# Patient Record
Sex: MALE | Race: WHITE | Employment: FULL TIME | URBAN - METROPOLITAN AREA
[De-identification: names, ages, dates, MRNs, and addresses within clinical notes are randomized per-mention and may not be internally consistent; named-entity substitution may affect disease eponyms.]

---

## 2017-10-05 ENCOUNTER — HOSPITAL ENCOUNTER (EMERGENCY)
Age: 52
Discharge: HOME | End: 2017-10-05
Attending: EMERGENCY MEDICINE
Payer: SELF-PAY

## 2017-10-05 ENCOUNTER — APPOINTMENT (OUTPATIENT)
Dept: CT IMAGING | Age: 52
End: 2017-10-05
Attending: EMERGENCY MEDICINE
Payer: SELF-PAY

## 2017-10-05 VITALS
RESPIRATION RATE: 18 BRPM | WEIGHT: 310.19 LBS | OXYGEN SATURATION: 95 % | HEART RATE: 104 BPM | HEIGHT: 74 IN | SYSTOLIC BLOOD PRESSURE: 169 MMHG | BODY MASS INDEX: 39.81 KG/M2 | DIASTOLIC BLOOD PRESSURE: 121 MMHG | TEMPERATURE: 99.3 F

## 2017-10-05 DIAGNOSIS — I10 ACCELERATED HYPERTENSION: Primary | ICD-10-CM

## 2017-10-05 DIAGNOSIS — I42.9 CARDIOMYOPATHY, UNSPECIFIED TYPE (HCC): ICD-10-CM

## 2017-10-05 PROCEDURE — 71275 CT ANGIOGRAPHY CHEST: CPT

## 2017-10-05 PROCEDURE — 74011250637 HC RX REV CODE- 250/637: Performed by: EMERGENCY MEDICINE

## 2017-10-05 PROCEDURE — 74011250636 HC RX REV CODE- 250/636: Performed by: EMERGENCY MEDICINE

## 2017-10-05 PROCEDURE — 74011636320 HC RX REV CODE- 636/320: Performed by: EMERGENCY MEDICINE

## 2017-10-05 PROCEDURE — 96360 HYDRATION IV INFUSION INIT: CPT

## 2017-10-05 PROCEDURE — 99284 EMERGENCY DEPT VISIT MOD MDM: CPT

## 2017-10-05 RX ORDER — SODIUM CHLORIDE 9 MG/ML
50 INJECTION, SOLUTION INTRAVENOUS
Status: COMPLETED | OUTPATIENT
Start: 2017-10-05 | End: 2017-10-05

## 2017-10-05 RX ORDER — AMLODIPINE BESYLATE 5 MG/1
5 TABLET ORAL DAILY
Status: DISCONTINUED | OUTPATIENT
Start: 2017-10-06 | End: 2017-10-05

## 2017-10-05 RX ORDER — SODIUM CHLORIDE 0.9 % (FLUSH) 0.9 %
10 SYRINGE (ML) INJECTION
Status: COMPLETED | OUTPATIENT
Start: 2017-10-05 | End: 2017-10-05

## 2017-10-05 RX ORDER — AMLODIPINE BESYLATE 5 MG/1
5 TABLET ORAL DAILY
Qty: 30 TAB | Refills: 3 | Status: SHIPPED | OUTPATIENT
Start: 2017-10-05

## 2017-10-05 RX ORDER — AMLODIPINE BESYLATE 5 MG/1
5 TABLET ORAL
Status: COMPLETED | OUTPATIENT
Start: 2017-10-05 | End: 2017-10-05

## 2017-10-05 RX ADMIN — AMLODIPINE BESYLATE 5 MG: 5 TABLET ORAL at 17:45

## 2017-10-05 RX ADMIN — SODIUM CHLORIDE 1000 ML: 900 INJECTION, SOLUTION INTRAVENOUS at 16:10

## 2017-10-05 RX ADMIN — Medication 10 ML: at 16:00

## 2017-10-05 RX ADMIN — SODIUM CHLORIDE 50 ML/HR: 900 INJECTION, SOLUTION INTRAVENOUS at 16:00

## 2017-10-05 RX ADMIN — IOPAMIDOL 85 ML: 755 INJECTION, SOLUTION INTRAVENOUS at 16:00

## 2017-10-05 NOTE — ED NOTES
Bedside shift change report given to Thania Stack (oncoming nurse) by me (offgoing nurse). Report included the following information SBAR, ED Summary, Procedure Summary and MAR.

## 2017-10-05 NOTE — ED NOTES
Discharge instructions given to patient by Daniel Dee DO . Pt verbalized understanding of discharge instructions. Pt discharged without difficulty. Pt discharged in stable condition via ambulation, accompanied by self.

## 2017-10-05 NOTE — DISCHARGE INSTRUCTIONS
Learning About High Blood Pressure  What is high blood pressure? Blood pressure is a measure of how hard the blood pushes against the walls of your arteries. It's normal for blood pressure to go up and down throughout the day, but if it stays up, you have high blood pressure. Another name for high blood pressure is hypertension. Two numbers tell you your blood pressure. The first number is the systolic pressure. It shows how hard the blood pushes when your heart is pumping. The second number is the diastolic pressure. It shows how hard the blood pushes between heartbeats, when your heart is relaxed and filling with blood. A blood pressure of less than 120/80 (say \"120 over 80\") is ideal for an adult. High blood pressure is 140/90 or higher. You have high blood pressure if your top number is 140 or higher or your bottom number is 90 or higher, or both. Many people fall into the category in between, called prehypertension. People with prehypertension need to make lifestyle changes to bring their blood pressure down and help prevent or delay high blood pressure. What happens when you have high blood pressure? · Blood flows through your arteries with too much force. Over time, this damages the walls of your arteries. But you can't feel it. High blood pressure usually doesn't cause symptoms. · Fat and calcium start to build up in your arteries. This buildup is called plaque. Plaque makes your arteries narrower and stiffer. Blood can't flow through them as easily. · This lack of good blood flow starts to damage some of the organs in your body. This can lead to problems such as coronary artery disease and heart attack, heart failure, stroke, kidney failure, and eye damage. How can you prevent high blood pressure? · Stay at a healthy weight. · Try to limit how much sodium you eat to less than 2,300 milligrams (mg) a day.  If you limit your sodium to 1,500 mg a day, you can lower your blood pressure even more.  ¨ Buy foods that are labeled \"unsalted,\" \"sodium-free,\" or \"low-sodium. \" Foods labeled \"reduced-sodium\" and \"light sodium\" may still have too much sodium. ¨ Flavor your food with garlic, lemon juice, onion, vinegar, herbs, and spices instead of salt. Do not use soy sauce, steak sauce, onion salt, garlic salt, mustard, or ketchup on your food. ¨ Use less salt (or none) when recipes call for it. You can often use half the salt a recipe calls for without losing flavor. · Be physically active. Get at least 30 minutes of exercise on most days of the week. Walking is a good choice. You also may want to do other activities, such as running, swimming, cycling, or playing tennis or team sports. · Limit alcohol to 2 drinks a day for men and 1 drink a day for women. · Eat plenty of fruits, vegetables, and low-fat dairy products. Eat less saturated and total fats. How is high blood pressure treated? · Your doctor will suggest making lifestyle changes. For example, your doctor may ask you to eat healthy foods, quit smoking, lose extra weight, and be more active. · If lifestyle changes don't help enough or your blood pressure is very high, you will have to take medicine every day. Follow-up care is a key part of your treatment and safety. Be sure to make and go to all appointments, and call your doctor if you are having problems. It's also a good idea to know your test results and keep a list of the medicines you take. Where can you learn more? Go to http://ana lilia-miguel.info/. Enter P501 in the search box to learn more about \"Learning About High Blood Pressure. \"  Current as of: March 23, 2016  Content Version: 11.3  © 6505-4429 [employer]. Care instructions adapted under license by [employer] (which disclaims liability or warranty for this information).  If you have questions about a medical condition or this instruction, always ask your healthcare professional. [employer] disclaims any warranty or liability for your use of this information. [facility] Departments     For adult and child immunizations, family planning, TB screening, STD testing and women's health services. [facility]: [place] [phone]      [address]   [address]   [address]   [address] [facility] Street: Urbano Enrique [address] [phone]      [address]          [address] [place] [address]     For primary care services, woman and child wellness, and some clinics providing specialty care. [facility] -- [address] [facility] 2525 [facility] [phone]   [address] [facility] 200 [facility] 3614 [facility] [phone]   339 [facility] [address] [phone]   [address]  [employer] [address] [place] [address] 5850  Community  [phone]   [address] [place] [address] [address] [phone]   [facility] 81 [facility] [phone]   Venancio Francois [facility] 1051 [facility] [phone]   [facility]: [facility] [address], [address] [facility], [address] [phone]     [address] [phone]   [facility] [address]50  Community  [phone]   [employer]  [address], [address] (www.[email or web address]/about/mission. asp) [phone]         Sexual Health/Woman Wellness Clinics    For STD/HIV testing and treatment, pregnancy testing and services, men's health, birth control services, LGBT services, and hepatitis/HPV vaccine services. [employer] for [place] [employer] [address]. [facility] [address] [facility] [address] [facility] 1579 [address] [phone]   [facility] [address], 5th floor [phone]   Pregnancy \[facility] [place] [facility]\"" of [address] Yovanny Chacko [address] for Women 5130 [facility].  [place] [phone]         [address]67 [facility] 94 [facility]   [phone]   [address]   [phone]   Women, Infant and Children's Services: [address] [phone]       600 [facility]   [phone]   [address]   [facility]     [phone]   [address] Crisis   [address] [facility] [address] [phone]     Local Primary Care Physicians  [facility] [phone]  MD Kevin Britton MD Cranford Lauber, MD [facility] Doctors [phone]  Elizabeth Umana, MD Olivia Davidson MD Sallee [place], MD [address] [address] [phone]  [place] [address] [facility] [phone]  MD Jodi Rose MD Scarlett Haring, MD Jaymes Collar, MD   [facility] [phone]  JYFE XXBRKN GA, MD Stephanie Agosto,  [facility] [address] [phone]  MD Ramandeep Page MD Donavon Grove, MD Erroll Pang, MD Frederico Hines, MD Wendi Daisy, MD Dona Frizzle, MD   17 57 [facility]  Kim Oropeza MD [address] [facility] [phone]  MD Rajwinder Cabrera, NP  MD Cameron Escalante MD Alean Gondola, MD Carlus Asa, MD Teddi Blowers, MD   3802 [facility] [phone]  MD Lizette Pickard, FNP  Haily Caballero, NP  Tabatha Landon, MD Estrellita Schofield MD Ponce Formica, MD [facility] [phone]  AshleyMD Nancy Garcia MD Charleen Clement, MD Evalyn Andrea, MD Charlena Madison, MD   [address] [phone]  MD Chad Rangel Hilaria Gaucher, 611 [facility] [phone]  MD Nel Menendez, MD Va Rowe MD   [facility] [phone]  MD Rocky Sexton, MD Delgado Brooks MD Lehman Circle, MD Gogo Stevens, JOAN Correia MD 1619 [facility]   [phone]  MD Sigifredo Jarrett MD Valeri New, MD   4494 [facility] [phone]  [address] [place] [address] EarleMD Phuong rahman, JEANNINE Mcadams, AMAYA Mcadams, GREYSONP  AMAYA Amezquita MD Rolley Less, JOAN Delatorre, DO Miscellaneous:  Haven Collazo -[phone]

## 2017-10-05 NOTE — ED TRIAGE NOTES
Ran out of his BP medications 9 months ago and has not established a PCP yet. Came in because his family told him he needed to go get it checked. Went to [facility] and it was high and his d dimer was elevated.

## 2017-10-05 NOTE — ED PROVIDER NOTES
[facility] 76.  EMERGENCY DEPARTMENT HISTORY AND PHYSICAL EXAM       Date of Service: 10/5/2017   Patient Name: Aminata Braxton   YOB: 1965  Medical Record Number: [id number]    History of Presenting Illness     Chief Complaint   Patient presents with    Referral / Consult     pt went to [facility] for hypertension follow up as he has been out of his med for 9 months. He had EKG and labs done which showed elevated d-dimer. Referred to ED for CT scan. History Provided By:  patient    Additional History:   Amintaa Braxton is a 46 y.o. male with PMhx significant for HTN who presents ambulatory to the ED with cc of fatigue, SOB and leg swelling, all sx present x 1.5 mo. Pt states that he moved to [place] 11 months ago and has not established primary care. Because of this, he states that he has not been taking medications for his HTN. He cannot recall the names of his medications. He states that his fatigue/SOB/leg swelling have been on going and that his family urged him to be evaluated today at [facility]. [facility] noted an elevated D dimer and referred him to the ED. Pt also reports diarrhea x 5 days. He denies CP, nausea, vomiting, hematuria. He denies alcohol/drugs/smoking. 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 He denies drinking coffee/energy drinks. Family hx significant for MI. Social Hx: - Tobacco, - EtOH, - Illicit Drugs    There are no other complaints, changes or physical findings at this time. Primary Care Provider: None       Past History     Past Medical History:   Past Medical History:   Diagnosis Date    Hypertension     Ill-defined condition     broken clavicle, lt elbow - not surgically repaired        Past Surgical History:   History reviewed. No pertinent surgical history. Family History:   History reviewed. No pertinent family history.      Social History:   Social History   Substance Use Topics    Smoking status: Never Smoker    Smokeless tobacco: Never Used    Alcohol use No        Allergies:   No Known Allergies      Review of Systems   Review of Systems   Constitutional: Positive for fatigue. Negative for appetite change, chills and fever. HENT: Negative. Negative for congestion, rhinorrhea, sinus pressure and sore throat. Eyes: Negative. Respiratory: Positive for shortness of breath. Negative for cough, choking, chest tightness and wheezing. Cardiovascular: Positive for leg swelling. Negative for chest pain and palpitations. Gastrointestinal: Positive for diarrhea. Negative for abdominal pain, constipation, nausea and vomiting. Endocrine: Negative. Genitourinary: Negative. Negative for difficulty urinating, dysuria, flank pain, hematuria and urgency. Musculoskeletal: Negative. Skin: Negative. Neurological: Negative. Negative for dizziness, speech difficulty, weakness, light-headedness, numbness and headaches. Psychiatric/Behavioral: Negative. All other systems reviewed and are negative. Physical Exam  Physical Exam   Constitutional: He is oriented to person, place, and time. He appears well-developed and well-nourished. No distress. HENT:   Head: Normocephalic and atraumatic. Mouth/Throat: Oropharynx is clear and moist. No oropharyngeal exudate. Eyes: Conjunctivae and EOM are normal. Pupils are equal, round, and reactive to light. Neck: Normal range of motion. Neck supple. No JVD present. No tracheal deviation present. Cardiovascular: Normal rate, regular rhythm, normal heart sounds and intact distal pulses. No murmur heard. Pulmonary/Chest: Effort normal and breath sounds normal. No stridor. No respiratory distress. He has no wheezes. He has no rales. He exhibits no tenderness. Abdominal: Soft. He exhibits no distension. There is no tenderness. There is no rebound and no guarding. Obese     Musculoskeletal: Normal range of motion.  He exhibits no edema or tenderness. Neurological: He is alert and oriented to person, place, and time. No cranial nerve deficit. No focal motor or sensory deficits    Skin: Skin is warm and dry. He is not diaphoretic. Psychiatric: He has a normal mood and affect. His behavior is normal.   Nursing note and vitals reviewed. Medical Decision Making   I am the first provider for this patient. I reviewed the vital signs, available nursing notes, past medical history, past surgical history, family history and social history. Provider Notes:   DDx: PE, accelerated HTN, ACS      ED Course:  4:35 PM   Initial assessment performed. The patients presenting problems have been discussed, and they are in agreement with the care plan formulated and outlined with them. I have encouraged them to ask questions as they arise throughout their visit. Nitroglycerin paste removed from left upper chest that had been placed by Anuj Sutherland DO        Diagnostic Study Results     Labs -   Results and EKG reviewed from Anuj Sutherland DO    Radiologic Studies -  The following have been ordered and reviewed:  CTA CHEST W OR W WO CONT   Final Result   EXAM:  CTA CHEST W OR W WO CONT     INDICATION:   Chest pain, SOA, elevated D Dimer     COMPARISON: None.     CONTRAST:  85 mL of Isovue-370.     TECHNIQUE:   Precontrast  images were obtained to localize the volume for acquisition. Multislice helical CT arteriography was performed from the diaphragm to the  thoracic inlet during uneventful rapid bolus intravenous contrast  administration. Lung and soft tissue windows were generated. Coronal and  sagittal images were generated and 3D post processing consisting of coronal  maximum intensity images was performed.   CT dose reduction was achieved through  use of a standardized protocol tailored for this examination and automatic  exposure control for dose modulation.     FINDINGS:     THYROID: No nodule. MEDIASTINUM: No mass or lymphadenopathy. JAZMIN: No mass or lymphadenopathy. THORACIC AORTA: No dissection or aneurysm. There is an incidental aberrant right  subclavian artery passing posterior to the trachea and esophagus. PULMONARY ARTERIES: Normal in caliber. No evidence of pulmonary embolus. TRACHEA/BRONCHI: Patent. ESOPHAGUS: No wall thickening or dilatation. HEART: The heart is significantly enlarged. PLEURA: No effusion or pneumothorax. LUNGS: No nodule, mass, or airspace disease. INCIDENTALLY IMAGED UPPER ABDOMEN: No focal abnormality. BONES: No destructive bone lesion.     IMPRESSION  IMPRESSION:     1. Negative for pulmonary embolus. 2. Significant cardiomegaly. 3. Aberrant right subclavian artery     CT Results  (Last 48 hours)               10/05/17 1600  CTA CHEST W OR W WO CONT Final result    Impression:  IMPRESSION:       1. Negative for pulmonary embolus. 2. Significant cardiomegaly. 3. Aberrant right subclavian artery                           Narrative:  EXAM:  CTA CHEST W OR W WO CONT       INDICATION:   Chest pain, SOA, elevated D Dimer       COMPARISON: None. CONTRAST:  85 mL of Isovue-370. TECHNIQUE:    Precontrast  images were obtained to localize the volume for acquisition. Multislice helical CT arteriography was performed from the diaphragm to the   thoracic inlet during uneventful rapid bolus intravenous contrast   administration. Lung and soft tissue windows were generated. Coronal and   sagittal images were generated and 3D post processing consisting of coronal   maximum intensity images was performed. CT dose reduction was achieved through   use of a standardized protocol tailored for this examination and automatic   exposure control for dose modulation. FINDINGS:       THYROID: No nodule. MEDIASTINUM: No mass or lymphadenopathy. JAZMIN: No mass or lymphadenopathy. THORACIC AORTA: No dissection or aneurysm.  There is an incidental aberrant right   subclavian artery passing posterior to the trachea and esophagus. PULMONARY ARTERIES: Normal in caliber. No evidence of pulmonary embolus. TRACHEA/BRONCHI: Patent. ESOPHAGUS: No wall thickening or dilatation. HEART: The heart is significantly enlarged. PLEURA: No effusion or pneumothorax. LUNGS: No nodule, mass, or airspace disease. INCIDENTALLY IMAGED UPPER ABDOMEN: No focal abnormality. BONES: No destructive bone lesion. CXR Results  (Last 48 hours)    None          Vital Signs-Reviewed the patient's vital signs. Patient Vitals for the past 12 hrs:   Temp Pulse Resp BP SpO2   10/05/17 1630 - - - (!) 157/102 95 %   10/05/17 1615 - - - (!) 175/111 96 %   10/05/17 1601 - - - (!) 162/112 -   10/05/17 1500 - - - (!) 156/114 95 %   10/05/17 1433 - - - (!) 163/113 95 %   10/05/17 1422 99.3 °F (37.4 °C) (!) 104 18 (!) 176/124 97 %       Medications Given in the ED:  Medications   amLODIPine (NORVASC) tablet 5 mg (not administered)   sodium chloride 0.9 % bolus infusion 1,000 mL (1,000 mL IntraVENous New Bag 10/5/17 1610)   0.9% sodium chloride infusion (50 mL/hr IntraVENous New Bag 10/5/17 1600)   sodium chloride (NS) flush 10 mL (10 mL IntraVENous Given 10/5/17 1600)   iopamidol (ISOVUE-370) 76 % injection 100 mL (85 mL IntraVENous Given 10/5/17 1600)       Pulse Oximetry Analysis - Normal 95% on room air     Cardiac Monitor:   Rate: 118    Discussed with pt importance of taking medication for BP, renal function up, unknown baseline, no neurologic deficits, cardiomyopathy on CT, will start Amlodipine, discussed with pt follow-up either a free clinic or obtain PCP, Marbin Santos DO    Diagnosis   Clinical Impression:   1. Accelerated hypertension    2.  Cardiomyopathy, unspecified type ([facility] [address]         Plan:  1:   Follow-up Information     Follow up With Details Comments Contact Info    None   None (395) Patient stated that they have no PCP      Roe Henriquez MD   [address] [address]  [phone]          2:   Current Discharge Medication List      START taking these medications    Details   amLODIPine (NORVASC) 5 mg tablet Take 1 Tab by mouth daily. Qty: 30 Tab, Refills: 3           Return to ED if Worse    Disposition Note:  Discharge Note:  5:33 PM  The pt is ready for discharge. The pt's signs, symptoms, diagnosis, and discharge instructions have been discussed and pt has conveyed their understanding. The pt is to follow up as recommended or return to ER should their symptoms worsen. Plan has been discussed and pt is in agreement. This note is prepared by Reji Mcgovern, acting as a Scribe for [address] DO: The scribe's documentation has been prepared under my direction and personally reviewed by me in its entirety. I confirm that the notes above accurately reflects all work, treatment, procedures, and medical decision making performed by me.    _______________________________   Attestations: This note is prepared by Reji Mcgovern, acting as a Scribe for [address] DO: The scribe's documentation has been prepared under my direction and personally reviewed by me in its entirety.  I confirm that the notes above accurately reflects all work, treatment, procedures, and medical decision making performed by me.  _______________________________

## 2017-10-05 NOTE — ED NOTES
Patient resting comfortably in bed and offered bathroom access. Patient rated pain at 0. Call bell and possessions within reach.

## 2018-07-30 PROBLEM — Z00.00 ENCOUNTER FOR PREVENTIVE HEALTH EXAMINATION: Status: ACTIVE | Noted: 2018-07-30

## 2018-08-08 ENCOUNTER — OUTPATIENT (OUTPATIENT)
Dept: OUTPATIENT SERVICES | Facility: HOSPITAL | Age: 53
LOS: 1 days | End: 2018-08-08
Payer: SELF-PAY

## 2018-08-08 ENCOUNTER — APPOINTMENT (OUTPATIENT)
Dept: CARDIOLOGY | Facility: CLINIC | Age: 53
End: 2018-08-08

## 2018-08-08 DIAGNOSIS — Z82.49 FAMILY HISTORY OF ISCHEMIC HEART DISEASE AND OTHER DISEASES OF THE CIRCULATORY SYSTEM: ICD-10-CM

## 2018-08-08 DIAGNOSIS — R00.2 PALPITATIONS: ICD-10-CM

## 2018-08-08 DIAGNOSIS — I10 ESSENTIAL (PRIMARY) HYPERTENSION: ICD-10-CM

## 2018-08-08 DIAGNOSIS — I25.10 ATHEROSCLEROTIC HEART DISEASE OF NATIVE CORONARY ARTERY WITHOUT ANGINA PECTORIS: ICD-10-CM

## 2018-08-08 PROCEDURE — G0463: CPT

## 2018-08-08 RX ORDER — METOPROLOL SUCCINATE 50 MG/1
50 TABLET, EXTENDED RELEASE ORAL DAILY
Qty: 90 | Refills: 3 | Status: ACTIVE | COMMUNITY
Start: 2018-08-08 | End: 1900-01-01

## 2018-10-10 ENCOUNTER — APPOINTMENT (OUTPATIENT)
Dept: CARDIOLOGY | Facility: CLINIC | Age: 53
End: 2018-10-10

## 2018-10-10 PROBLEM — Z82.49 FAMILY HISTORY OF CORONARY ARTERY DISEASE: Status: ACTIVE | Noted: 2018-10-10

## 2018-10-10 PROBLEM — Z82.49 FAMILY HISTORY OF VALVULAR HEART DISEASE: Status: ACTIVE | Noted: 2018-10-10

## 2018-10-10 PROBLEM — Z82.49 FAMILY HISTORY OF HYPERTENSION: Status: ACTIVE | Noted: 2018-10-10

## 2018-10-10 RX ORDER — HYDROCHLOROTHIAZIDE 25 MG/1
25 TABLET ORAL DAILY
Qty: 30 | Refills: 3 | Status: ACTIVE | COMMUNITY

## 2018-10-10 RX ORDER — NIFEDIPINE 90 MG/1
90 TABLET, EXTENDED RELEASE ORAL DAILY
Refills: 1 | Status: ACTIVE | COMMUNITY

## 2019-04-03 ENCOUNTER — APPOINTMENT (OUTPATIENT)
Dept: CARDIOLOGY | Facility: CLINIC | Age: 54
End: 2019-04-03

## 2022-10-18 ENCOUNTER — NON-APPOINTMENT (OUTPATIENT)
Age: 57
End: 2022-10-18

## 2024-12-25 PROBLEM — F10.90 ALCOHOL USE: Status: INACTIVE | Noted: 2018-10-10

## 2025-01-25 ENCOUNTER — EMERGENCY (EMERGENCY)
Facility: HOSPITAL | Age: 60
LOS: 1 days | Discharge: HOME | End: 2025-01-25
Attending: EMERGENCY MEDICINE
Payer: MEDICAID

## 2025-01-25 VITALS
RESPIRATION RATE: 18 BRPM | TEMPERATURE: 98 F | SYSTOLIC BLOOD PRESSURE: 190 MMHG | OXYGEN SATURATION: 98 % | DIASTOLIC BLOOD PRESSURE: 133 MMHG | HEART RATE: 89 BPM

## 2025-01-25 LAB
ALBUMIN SERPL ELPH-MCNC: 4.9 G/DL — SIGNIFICANT CHANGE UP (ref 3.3–5.2)
ALP SERPL-CCNC: 73 U/L — SIGNIFICANT CHANGE UP (ref 40–120)
ALT FLD-CCNC: 28 U/L — SIGNIFICANT CHANGE UP
ANION GAP SERPL CALC-SCNC: 15 MMOL/L — SIGNIFICANT CHANGE UP (ref 5–17)
APPEARANCE UR: CLEAR — SIGNIFICANT CHANGE UP
AST SERPL-CCNC: 20 U/L — SIGNIFICANT CHANGE UP
BACTERIA # UR AUTO: NEGATIVE /HPF — SIGNIFICANT CHANGE UP
BASOPHILS # BLD AUTO: 0.06 K/UL — SIGNIFICANT CHANGE UP (ref 0–0.2)
BASOPHILS NFR BLD AUTO: 0.5 % — SIGNIFICANT CHANGE UP (ref 0–2)
BILIRUB SERPL-MCNC: 0.5 MG/DL — SIGNIFICANT CHANGE UP (ref 0.4–2)
BILIRUB UR-MCNC: NEGATIVE — SIGNIFICANT CHANGE UP
BUN SERPL-MCNC: 20.8 MG/DL — HIGH (ref 8–20)
CALCIUM SERPL-MCNC: 9.4 MG/DL — SIGNIFICANT CHANGE UP (ref 8.4–10.5)
CAST: 1 /LPF — SIGNIFICANT CHANGE UP (ref 0–4)
CHLORIDE SERPL-SCNC: 101 MMOL/L — SIGNIFICANT CHANGE UP (ref 96–108)
CO2 SERPL-SCNC: 26 MMOL/L — SIGNIFICANT CHANGE UP (ref 22–29)
COLOR SPEC: YELLOW — SIGNIFICANT CHANGE UP
CREAT SERPL-MCNC: 1.3 MG/DL — SIGNIFICANT CHANGE UP (ref 0.5–1.3)
DIFF PNL FLD: ABNORMAL
EGFR: 63 ML/MIN/1.73M2 — SIGNIFICANT CHANGE UP
EOSINOPHIL # BLD AUTO: 0.17 K/UL — SIGNIFICANT CHANGE UP (ref 0–0.5)
EOSINOPHIL NFR BLD AUTO: 1.3 % — SIGNIFICANT CHANGE UP (ref 0–6)
GLUCOSE SERPL-MCNC: 173 MG/DL — HIGH (ref 70–99)
GLUCOSE UR QL: NEGATIVE MG/DL — SIGNIFICANT CHANGE UP
HCT VFR BLD CALC: 47.6 % — SIGNIFICANT CHANGE UP (ref 39–50)
HGB BLD-MCNC: 15.9 G/DL — SIGNIFICANT CHANGE UP (ref 13–17)
IMM GRANULOCYTES NFR BLD AUTO: 0.5 % — SIGNIFICANT CHANGE UP (ref 0–0.9)
KETONES UR-MCNC: NEGATIVE MG/DL — SIGNIFICANT CHANGE UP
LEUKOCYTE ESTERASE UR-ACNC: NEGATIVE — SIGNIFICANT CHANGE UP
LIDOCAIN IGE QN: 44 U/L — SIGNIFICANT CHANGE UP (ref 22–51)
LYMPHOCYTES # BLD AUTO: 1.28 K/UL — SIGNIFICANT CHANGE UP (ref 1–3.3)
LYMPHOCYTES # BLD AUTO: 9.8 % — LOW (ref 13–44)
MCHC RBC-ENTMCNC: 29.1 PG — SIGNIFICANT CHANGE UP (ref 27–34)
MCHC RBC-ENTMCNC: 33.4 G/DL — SIGNIFICANT CHANGE UP (ref 32–36)
MCV RBC AUTO: 87.2 FL — SIGNIFICANT CHANGE UP (ref 80–100)
MONOCYTES # BLD AUTO: 0.59 K/UL — SIGNIFICANT CHANGE UP (ref 0–0.9)
MONOCYTES NFR BLD AUTO: 4.5 % — SIGNIFICANT CHANGE UP (ref 2–14)
NEUTROPHILS # BLD AUTO: 10.92 K/UL — HIGH (ref 1.8–7.4)
NEUTROPHILS NFR BLD AUTO: 83.4 % — HIGH (ref 43–77)
NITRITE UR-MCNC: NEGATIVE — SIGNIFICANT CHANGE UP
PH UR: 6 — SIGNIFICANT CHANGE UP (ref 5–8)
PLATELET # BLD AUTO: 187 K/UL — SIGNIFICANT CHANGE UP (ref 150–400)
POTASSIUM SERPL-MCNC: 3.9 MMOL/L — SIGNIFICANT CHANGE UP (ref 3.5–5.3)
POTASSIUM SERPL-SCNC: 3.9 MMOL/L — SIGNIFICANT CHANGE UP (ref 3.5–5.3)
PROT SERPL-MCNC: 8.2 G/DL — SIGNIFICANT CHANGE UP (ref 6.6–8.7)
PROT UR-MCNC: 30 MG/DL
RBC # BLD: 5.46 M/UL — SIGNIFICANT CHANGE UP (ref 4.2–5.8)
RBC # FLD: 13.3 % — SIGNIFICANT CHANGE UP (ref 10.3–14.5)
RBC CASTS # UR COMP ASSIST: 140 /HPF — HIGH (ref 0–4)
SODIUM SERPL-SCNC: 142 MMOL/L — SIGNIFICANT CHANGE UP (ref 135–145)
SP GR SPEC: 1.02 — SIGNIFICANT CHANGE UP (ref 1–1.03)
SQUAMOUS # UR AUTO: 1 /HPF — SIGNIFICANT CHANGE UP (ref 0–5)
UROBILINOGEN FLD QL: 0.2 MG/DL — SIGNIFICANT CHANGE UP (ref 0.2–1)
WBC # BLD: 13.08 K/UL — HIGH (ref 3.8–10.5)
WBC # FLD AUTO: 13.08 K/UL — HIGH (ref 3.8–10.5)
WBC UR QL: 1 /HPF — SIGNIFICANT CHANGE UP (ref 0–5)

## 2025-01-25 PROCEDURE — 74176 CT ABD & PELVIS W/O CONTRAST: CPT | Mod: 26

## 2025-01-25 PROCEDURE — 83690 ASSAY OF LIPASE: CPT

## 2025-01-25 PROCEDURE — 96375 TX/PRO/DX INJ NEW DRUG ADDON: CPT

## 2025-01-25 PROCEDURE — 99284 EMERGENCY DEPT VISIT MOD MDM: CPT | Mod: 25

## 2025-01-25 PROCEDURE — 74176 CT ABD & PELVIS W/O CONTRAST: CPT | Mod: MC

## 2025-01-25 PROCEDURE — 96361 HYDRATE IV INFUSION ADD-ON: CPT

## 2025-01-25 PROCEDURE — 36415 COLL VENOUS BLD VENIPUNCTURE: CPT

## 2025-01-25 PROCEDURE — 87086 URINE CULTURE/COLONY COUNT: CPT

## 2025-01-25 PROCEDURE — 85025 COMPLETE CBC W/AUTO DIFF WBC: CPT

## 2025-01-25 PROCEDURE — 99284 EMERGENCY DEPT VISIT MOD MDM: CPT

## 2025-01-25 PROCEDURE — 81001 URINALYSIS AUTO W/SCOPE: CPT

## 2025-01-25 PROCEDURE — 80053 COMPREHEN METABOLIC PANEL: CPT

## 2025-01-25 PROCEDURE — 96374 THER/PROPH/DIAG INJ IV PUSH: CPT

## 2025-01-25 RX ORDER — KETOROLAC TROMETHAMINE 30 MG/ML
30 INJECTION INTRAMUSCULAR; INTRAVENOUS ONCE
Refills: 0 | Status: DISCONTINUED | OUTPATIENT
Start: 2025-01-25 | End: 2025-01-25

## 2025-01-25 RX ORDER — OXYCODONE AND ACETAMINOPHEN 5; 325 MG/1; MG/1
1 TABLET ORAL
Qty: 9 | Refills: 0
Start: 2025-01-25 | End: 2025-01-27

## 2025-01-25 RX ORDER — SODIUM CHLORIDE 9 MG/ML
1000 INJECTION, SOLUTION INTRAMUSCULAR; INTRAVENOUS; SUBCUTANEOUS ONCE
Refills: 0 | Status: COMPLETED | OUTPATIENT
Start: 2025-01-25 | End: 2025-01-25

## 2025-01-25 RX ORDER — ONDANSETRON 4 MG/1
4 TABLET ORAL ONCE
Refills: 0 | Status: COMPLETED | OUTPATIENT
Start: 2025-01-25 | End: 2025-01-25

## 2025-01-25 RX ORDER — TAMSULOSIN HYDROCHLORIDE 0.4 MG/1
1 CAPSULE ORAL
Qty: 3 | Refills: 0
Start: 2025-01-25 | End: 2025-01-27

## 2025-01-25 RX ORDER — IBUPROFEN 200 MG
1 TABLET ORAL
Qty: 15 | Refills: 0
Start: 2025-01-25 | End: 2025-01-29

## 2025-01-25 RX ORDER — ONDANSETRON 4 MG/1
1 TABLET ORAL
Qty: 1 | Refills: 0
Start: 2025-01-25 | End: 2025-01-27

## 2025-01-25 RX ADMIN — ONDANSETRON 4 MILLIGRAM(S): 4 TABLET ORAL at 05:32

## 2025-01-25 RX ADMIN — KETOROLAC TROMETHAMINE 30 MILLIGRAM(S): 30 INJECTION INTRAMUSCULAR; INTRAVENOUS at 06:42

## 2025-01-25 RX ADMIN — SODIUM CHLORIDE 1000 MILLILITER(S): 9 INJECTION, SOLUTION INTRAMUSCULAR; INTRAVENOUS; SUBCUTANEOUS at 05:32

## 2025-01-25 RX ADMIN — KETOROLAC TROMETHAMINE 30 MILLIGRAM(S): 30 INJECTION INTRAMUSCULAR; INTRAVENOUS at 05:34

## 2025-01-25 RX ADMIN — SODIUM CHLORIDE 1000 MILLILITER(S): 9 INJECTION, SOLUTION INTRAMUSCULAR; INTRAVENOUS; SUBCUTANEOUS at 06:42

## 2025-01-25 NOTE — ED PROVIDER NOTE - OBJECTIVE STATEMENT
59 year old male PMHx kidney stones present to ED for left sided flank pain. Pt reports onset 4 hours prior to arrival. +nausea, +vomiting. NBNB emesis. Denies fever, chills, HA, lightheadedness, dizziness, SOB, CP, diarrhea, abd pain.

## 2025-01-25 NOTE — ED PROVIDER NOTE - PATIENT PORTAL LINK FT
You can access the FollowMyHealth Patient Portal offered by [facility] by registering at the following website: [email or web address]. By joining [employer]’s FollowMyHealth portal, you will also be able to view your health information using other applications (apps) compatible with our system.

## 2025-01-25 NOTE — ED PROVIDER NOTE - CLINICAL SUMMARY MEDICAL DECISION MAKING FREE TEXT BOX
59 year old male PMHx kidney stones present to ED for left sided flank pain. Pt reports onset 4 hours prior to arrival. +nausea, +vomiting. NBNB emesis. Denies fever, chills, HA, lightheadedness, dizziness, SOB, CP, diarrhea, abd pain.   CT, labs, meds, re-assess 59 year old male PMHx kidney stones present to ED for left sided flank pain. Pt reports onset 4 hours prior to arrival. +nausea, +vomiting. NBNB emesis. Denies fever, chills, HA, lightheadedness, dizziness, SOB, CP, diarrhea, abd pain.   CT, labs, meds, re-assess  CT+5mm obstructing stone, labs non-actionable, pain controled, pt tolerating PO    Pt reassessed, pt feeling better at this time, vss, pt able to walk, talk and vocalized plan of action. Discussed in depth and explained to pt in depth the next steps that need to be taken including proper follow up with PCP or specialists. All incidental findings were discussed with pt as well. Pt verbalized their concerns and all questions were answered. Pt understands dispo and wants discharge. Given good instructions when to return to ED, strict return precautions and importance of f/u.

## 2025-01-25 NOTE — ED ADULT NURSE NOTE - OBJECTIVE STATEMENT
Pt is a 60yo male who presents to the ED with complaints of left sided abdominal pain and belching. Upon assessment, PT is alert and oriented, with a patent and self maintained airway, with non labored breathing. PT denies CP, SOB, HA, Fevers or chills.

## 2025-01-25 NOTE — ED ADULT TRIAGE NOTE - BP NONINVASIVE SYSTOLIC (MM HG)
I called Tawanna Ontiveros. She has not picked up her partial refill. I instructed her to  that script and call when she was getting low for the additional part of her script. 190

## 2025-01-25 NOTE — ED ADULT TRIAGE NOTE - CHIEF COMPLAINT QUOTE
Pt walks in for triage c/o left sided mid abdominal pain in the same spot for the past couple of hours. Pt is passing gas and belching but denies having BM.

## 2025-01-25 NOTE — ED PROVIDER NOTE - ATTENDING APP SHARED VISIT CONTRIBUTION OF CARE
Pain and exam is consistent with renal colic, was managed with conservative measures and CT confirms an obstructing stone. Stable for dc with urology follow up.

## 2025-01-25 NOTE — ED PROVIDER NOTE - CARE PROVIDER_API CALL
Araceli Connell  Urology  [address], [address]  [place] [address]  Phone: [phone]  Fax: [phone]  Follow Up Time:

## 2025-01-26 LAB
CULTURE RESULTS: SIGNIFICANT CHANGE UP
SPECIMEN SOURCE: SIGNIFICANT CHANGE UP

## 2025-01-27 ENCOUNTER — APPOINTMENT (OUTPATIENT)
Dept: UROLOGY | Facility: CLINIC | Age: 60
End: 2025-01-27
Payer: MEDICAID

## 2025-01-27 VITALS
HEIGHT: 74 IN | WEIGHT: 250 LBS | BODY MASS INDEX: 32.08 KG/M2 | DIASTOLIC BLOOD PRESSURE: 108 MMHG | SYSTOLIC BLOOD PRESSURE: 169 MMHG | HEART RATE: 105 BPM

## 2025-01-27 DIAGNOSIS — N20.0 CALCULUS OF KIDNEY: ICD-10-CM

## 2025-01-27 DIAGNOSIS — R10.9 UNSPECIFIED ABDOMINAL PAIN: ICD-10-CM

## 2025-01-27 PROCEDURE — 99204 OFFICE O/P NEW MOD 45 MIN: CPT

## 2025-01-27 RX ORDER — ONDANSETRON 8 MG/1
8 TABLET, ORALLY DISINTEGRATING ORAL
Qty: 15 | Refills: 0 | Status: ACTIVE | COMMUNITY
Start: 2025-01-27 | End: 1900-01-01

## 2025-01-29 DIAGNOSIS — N13.2 HYDRONEPHROSIS WITH RENAL AND URETERAL CALCULOUS OBSTRUCTION: ICD-10-CM

## 2025-01-29 DIAGNOSIS — R10.9 UNSPECIFIED ABDOMINAL PAIN: ICD-10-CM

## 2025-02-01 ENCOUNTER — NON-APPOINTMENT (OUTPATIENT)
Age: 60
End: 2025-02-01

## 2025-02-03 ENCOUNTER — OUTPATIENT (OUTPATIENT)
Dept: OUTPATIENT SERVICES | Facility: HOSPITAL | Age: 60
LOS: 1 days | End: 2025-02-03
Payer: MEDICAID

## 2025-02-03 VITALS
OXYGEN SATURATION: 99 % | SYSTOLIC BLOOD PRESSURE: 160 MMHG | HEIGHT: 76 IN | DIASTOLIC BLOOD PRESSURE: 88 MMHG | RESPIRATION RATE: 20 BRPM | WEIGHT: 253.09 LBS | HEART RATE: 99 BPM | TEMPERATURE: 98 F

## 2025-02-03 DIAGNOSIS — I10 ESSENTIAL (PRIMARY) HYPERTENSION: ICD-10-CM

## 2025-02-03 DIAGNOSIS — Z01.818 ENCOUNTER FOR OTHER PREPROCEDURAL EXAMINATION: ICD-10-CM

## 2025-02-03 DIAGNOSIS — N20.1 CALCULUS OF URETER: ICD-10-CM

## 2025-02-03 DIAGNOSIS — Z87.442 PERSONAL HISTORY OF URINARY CALCULI: Chronic | ICD-10-CM

## 2025-02-03 DIAGNOSIS — Z29.9 ENCOUNTER FOR PROPHYLACTIC MEASURES, UNSPECIFIED: ICD-10-CM

## 2025-02-03 LAB
A1C WITH ESTIMATED AVERAGE GLUCOSE RESULT: 6.4 % — HIGH (ref 4–5.6)
ANION GAP SERPL CALC-SCNC: 13 MMOL/L — SIGNIFICANT CHANGE UP (ref 5–17)
APPEARANCE UR: CLEAR — SIGNIFICANT CHANGE UP
BACTERIA # UR AUTO: NEGATIVE /HPF — SIGNIFICANT CHANGE UP
BASOPHILS # BLD AUTO: 0.03 K/UL — SIGNIFICANT CHANGE UP (ref 0–0.2)
BASOPHILS NFR BLD AUTO: 0.4 % — SIGNIFICANT CHANGE UP (ref 0–2)
BILIRUB UR-MCNC: NEGATIVE — SIGNIFICANT CHANGE UP
BLD GP AB SCN SERPL QL: SIGNIFICANT CHANGE UP
BUN SERPL-MCNC: 21.4 MG/DL — HIGH (ref 8–20)
CALCIUM SERPL-MCNC: 9.5 MG/DL — SIGNIFICANT CHANGE UP (ref 8.4–10.5)
CHLORIDE SERPL-SCNC: 97 MMOL/L — SIGNIFICANT CHANGE UP (ref 96–108)
CO2 SERPL-SCNC: 27 MMOL/L — SIGNIFICANT CHANGE UP (ref 22–29)
COLOR SPEC: YELLOW — SIGNIFICANT CHANGE UP
CREAT SERPL-MCNC: 2.1 MG/DL — HIGH (ref 0.5–1.3)
DIFF PNL FLD: ABNORMAL
EGFR: 36 ML/MIN/1.73M2 — LOW
EOSINOPHIL # BLD AUTO: 0.35 K/UL — SIGNIFICANT CHANGE UP (ref 0–0.5)
EOSINOPHIL NFR BLD AUTO: 4.3 % — SIGNIFICANT CHANGE UP (ref 0–6)
ESTIMATED AVERAGE GLUCOSE: 137 MG/DL — HIGH (ref 68–114)
GLUCOSE SERPL-MCNC: 125 MG/DL — HIGH (ref 70–99)
GLUCOSE UR QL: NEGATIVE MG/DL — SIGNIFICANT CHANGE UP
HCT VFR BLD CALC: 42.6 % — SIGNIFICANT CHANGE UP (ref 39–50)
HGB BLD-MCNC: 14.5 G/DL — SIGNIFICANT CHANGE UP (ref 13–17)
IMM GRANULOCYTES NFR BLD AUTO: 0.4 % — SIGNIFICANT CHANGE UP (ref 0–0.9)
KETONES UR-MCNC: ABNORMAL MG/DL
LEUKOCYTE ESTERASE UR-ACNC: NEGATIVE — SIGNIFICANT CHANGE UP
LYMPHOCYTES # BLD AUTO: 1.14 K/UL — SIGNIFICANT CHANGE UP (ref 1–3.3)
LYMPHOCYTES # BLD AUTO: 14.1 % — SIGNIFICANT CHANGE UP (ref 13–44)
MCHC RBC-ENTMCNC: 29.6 PG — SIGNIFICANT CHANGE UP (ref 27–34)
MCHC RBC-ENTMCNC: 34 G/DL — SIGNIFICANT CHANGE UP (ref 32–36)
MCV RBC AUTO: 86.9 FL — SIGNIFICANT CHANGE UP (ref 80–100)
MONOCYTES # BLD AUTO: 0.62 K/UL — SIGNIFICANT CHANGE UP (ref 0–0.9)
MONOCYTES NFR BLD AUTO: 7.7 % — SIGNIFICANT CHANGE UP (ref 2–14)
NEUTROPHILS # BLD AUTO: 5.93 K/UL — SIGNIFICANT CHANGE UP (ref 1.8–7.4)
NEUTROPHILS NFR BLD AUTO: 73.1 % — SIGNIFICANT CHANGE UP (ref 43–77)
NITRITE UR-MCNC: NEGATIVE — SIGNIFICANT CHANGE UP
PH UR: 5.5 — SIGNIFICANT CHANGE UP (ref 5–8)
PLATELET # BLD AUTO: 243 K/UL — SIGNIFICANT CHANGE UP (ref 150–400)
POTASSIUM SERPL-MCNC: 4.2 MMOL/L — SIGNIFICANT CHANGE UP (ref 3.5–5.3)
POTASSIUM SERPL-SCNC: 4.2 MMOL/L — SIGNIFICANT CHANGE UP (ref 3.5–5.3)
PROT UR-MCNC: 30 MG/DL
RBC # BLD: 4.9 M/UL — SIGNIFICANT CHANGE UP (ref 4.2–5.8)
RBC # FLD: 13.2 % — SIGNIFICANT CHANGE UP (ref 10.3–14.5)
RBC CASTS # UR COMP ASSIST: 5 /HPF — HIGH (ref 0–4)
SODIUM SERPL-SCNC: 137 MMOL/L — SIGNIFICANT CHANGE UP (ref 135–145)
SP GR SPEC: 1.02 — SIGNIFICANT CHANGE UP (ref 1–1.03)
UROBILINOGEN FLD QL: 0.2 MG/DL — SIGNIFICANT CHANGE UP (ref 0.2–1)
WBC # BLD: 8.1 K/UL — SIGNIFICANT CHANGE UP (ref 3.8–10.5)
WBC # FLD AUTO: 8.1 K/UL — SIGNIFICANT CHANGE UP (ref 3.8–10.5)
WBC UR QL: 1 /HPF — SIGNIFICANT CHANGE UP (ref 0–5)

## 2025-02-03 PROCEDURE — 87086 URINE CULTURE/COLONY COUNT: CPT

## 2025-02-03 PROCEDURE — G0463: CPT

## 2025-02-03 PROCEDURE — 83036 HEMOGLOBIN GLYCOSYLATED A1C: CPT

## 2025-02-03 PROCEDURE — 93005 ELECTROCARDIOGRAM TRACING: CPT

## 2025-02-03 PROCEDURE — 81001 URINALYSIS AUTO W/SCOPE: CPT

## 2025-02-03 PROCEDURE — 36415 COLL VENOUS BLD VENIPUNCTURE: CPT

## 2025-02-03 PROCEDURE — 80048 BASIC METABOLIC PNL TOTAL CA: CPT

## 2025-02-03 PROCEDURE — 86900 BLOOD TYPING SEROLOGIC ABO: CPT

## 2025-02-03 PROCEDURE — 86850 RBC ANTIBODY SCREEN: CPT

## 2025-02-03 PROCEDURE — 86901 BLOOD TYPING SEROLOGIC RH(D): CPT

## 2025-02-03 PROCEDURE — 93010 ELECTROCARDIOGRAM REPORT: CPT

## 2025-02-03 PROCEDURE — 85025 COMPLETE CBC W/AUTO DIFF WBC: CPT

## 2025-02-03 NOTE — H&P PST ADULT - ASSESSMENT
Pt is a 59 years old male seen today pre-op for  Cystoscopy left ureteroscopy, lithotripsy of stone with ureteral stents placement for left ureter calculus.  Pt recently presented to [facility] for left flank pain. CT Renal 25.revealed Obstructing 5mm  left ureteropelvic junction calculus. Pt medical hx includes HTN, pre-diabetes, HLD. Pt reporst intermittent left flank region tenderness, denies hematuria, denies dysuria, denies fever/chills and any other related issues. Pt scheduled for this surgery on 25 with Dr. Becki Charles . Surgery protocol reviewed with Pt today. Pt to follow up with PCP for evaluation and clearance prior to this surgery  Patient instructed on NPO protocol   Patient instructed to stop NSAID, all vitamins supplement, Fish oil, COQ 10,  herbals 5 days before this surgery.   Pt okay to take Tylenol as needed for pain   Patient will continue to take all his medications as prescribed    Patient instructed on infection prevention   Chlorhexidine scrub instructions provided  CAPRINI VTE 2.0 SCORE [CLOT updated 2019]    AGE RELATED RISK FACTORS                                                       MOBILITY RELATED FACTORS  [X ] Age 41-60 years                                            (1 Point)                    [ ] Bed rest                                                        (1 Point)  [ ] Age: 61-74 years                                           (2 Points)                  [ ] Plaster cast                                                   (2 Points)  [ ] Age= 75 years                                              (3 Points)                    [ ] Bed bound for more than 72 hours                 (2 Points)    DISEASE RELATED RISK FACTORS                                               GENDER SPECIFIC FACTORS  [ ] Edema in the lower extremities                       (1 Point)              [ ] Pregnancy                                                     (1 Point)  [ ] Varicose veins                                               (1 Point)                     [ ] Post-partum < 6 weeks                                   (1 Point)             [ X] BMI > 25 Kg/m2                                            (1 Point)                     [ ] Hormonal therapy  or oral contraception          (1 Point)                 [ ] Sepsis (in the previous month)                        (1 Point)               [ ] History of pregnancy complications                 (1 point)  [ ] Pneumonia or serious lung disease                                               [ ] Unexplained or recurrent                     (1 Point)           (in the previous month)                               (1 Point)  [ ] Abnormal pulmonary function test                     (1 Point)                 SURGERY RELATED RISK FACTORS  [ ] Acute myocardial infarction                              (1 Point)               [ ]  Section                                             (1 Point)  [ ] Congestive heart failure (in the previous month)  (1 Point)      [ ] Minor surgery                                                  (1 Point)   [ ] Inflammatory bowel disease                             (1 Point)               [ ] Arthroscopic surgery                                        (2 Points)  [ ] Central venous access                                      (2 Points)                [ X] General surgery lasting more than 45 minutes (2 points)  [ ] Malignancy- Present or previous                   (2 Points)                [ ] Elective arthroplasty                                         (5 points)    [ ] Stroke (in the previous month)                          (5 Points)                                                                                                                                                           HEMATOLOGY RELATED FACTORS                                                 TRAUMA RELATED RISK FACTORS  [ ] Prior episodes of VTE                                     (3 Points)                [ ] Fracture of the hip, pelvis, or leg                       (5 Points)  [ ] Positive family history for VTE                         (3 Points)             [ ] Acute spinal cord injury (in the previous month)  (5 Points)  [ ] Prothrombin 27475 A                                     (3 Points)               [ ] Paralysis  (less than 1 month)                             (5 Points)  [ ] Factor V Leiden                                             (3 Points)                  [ ] Multiple Trauma within 1 month                        (5 Points)  [ ] Lupus anticoagulants                                     (3 Points)                                                           [ ] Anticardiolipin antibodies                               (3 Points)                                                       [ ] High homocysteine in the blood                      (3 Points)                                             [ ] Other congenital or acquired thrombophilia      (3 Points)                                                [ ] Heparin induced thrombocytopenia                  (3 Points)                                     Total Score [       4   ]  OPIOID RISK TOOL    ALYCE EACH BOX THAT APPLIES AND ADD TOTALS AT THE END    FAMILY HISTORY OF SUBSTANCE ABUSE                 FEMALE         MALE                                                Alcohol                             [  ]1 pt          [  ]3pts                                               Illegal Durgs                     [  ]2 pts        [  ]3pts                                               Rx Drugs                           [  ]4 pts        [  ]4 pts    PERSONAL HISTORY OF SUBSTANCE ABUSE                                                                                          Alcohol                             [  ]3 pts       [  ]3 pts                                               Illegal Drugs                     [  ]4 pts        [  ]4 pts                                               Rx Drugs                           [  ]5 pts        [  ]5 pts    AGE BETWEEN 16-45 YEARS                                      [  ]1 pt         [  ]1 pt    HISTORY OF PREADOLESCENT   SEXUAL ABUSE                                                             [  ]3 pts        [  ]0pts    PSYCHOLOGICAL DISEASE                     ADD, OCD, Bipolar, Schizophrenia        [  ]2 pts         [  ]2 pts                      Depression                                               [  ]1 pt           [  ]1 pt           SCORING TOTAL   (add numbers and type here)              (**0*)                                     A score of 3 or lower indicated LOW risk for future opioid abuse  A score of 4 to 7 indicated moderate risk for future opioid abuse  A score of 8 or higher indicates a high risk for opioid abuse

## 2025-02-03 NOTE — H&P PST ADULT - HISTORY OF PRESENT ILLNESS
Pt is a 59 years old male seen today pre-op for  Cystoscopy left ureteroscopy, lithotripsy of stone with ureteral stents placement for left flank pain   Pt is a 59 years old male seen today pre-op for  Cystoscopy left ureteroscopy, lithotripsy of stone with ureteral stents placement for left flank pain. Pt recently presented to [facility] for left flank pain.    Pt is a 59 years old male seen today pre-op for  Cystoscopy left ureteroscopy, lithotripsy of stone with ureteral stents placement for left ureter calculus.  Pt recently presented to [facility] for left flank pain. CT Renal 1/25/25.revealed Obstructing 5mm  left ureteropelvic junction calculus. Pt medical hx includes HTN, pre-diabetes, HLD. Pt reporst intermittent left flank region tenderness, denies hematuria, denies dysuria, denies fever/chills and any other related issues. Pt scheduled for this surgery on 2/11/25 with Dr. Becki Charles

## 2025-02-03 NOTE — H&P PST ADULT - SPO2 (%)
Patients GI provider:  Dr. Tyson    Number to return call: [phone]    Reason for call: Pt's wife called and wanted to speak with someone about pt's colon results. I spoke with Jocelin Marinelli and she stated that the results were not seen by the Dr Tyson yet, and that she would reach out to Dr Tyson to read and send pt a message I [facility]. Pt informed    Scheduled procedure/appointment date if applicable: Apt/procedure N/A    
99

## 2025-02-03 NOTE — H&P PST ADULT - NSICDXPASTMEDICALHX_GEN_ALL_CORE_FT
PAST MEDICAL HISTORY:  HLD (hyperlipidemia)     Hypertension     Left flank pain      PAST MEDICAL HISTORY:  HLD (hyperlipidemia)     Hypertension     Left flank pain     Pre-diabetes      PAST MEDICAL HISTORY:  Calculus of left ureter     HLD (hyperlipidemia)     Hypertension     Left flank pain     Pre-diabetes

## 2025-02-04 LAB
CULTURE RESULTS: SIGNIFICANT CHANGE UP
SPECIMEN SOURCE: SIGNIFICANT CHANGE UP

## 2025-02-06 ENCOUNTER — NON-APPOINTMENT (OUTPATIENT)
Age: 60
End: 2025-02-06

## 2025-02-07 ENCOUNTER — OUTPATIENT (OUTPATIENT)
Dept: OUTPATIENT SERVICES | Facility: HOSPITAL | Age: 60
LOS: 1 days | End: 2025-02-07
Payer: MEDICAID

## 2025-02-07 DIAGNOSIS — Z87.442 PERSONAL HISTORY OF URINARY CALCULI: Chronic | ICD-10-CM

## 2025-02-07 DIAGNOSIS — Z01.812 ENCOUNTER FOR PREPROCEDURAL LABORATORY EXAMINATION: ICD-10-CM

## 2025-02-07 PROBLEM — N20.1 CALCULUS OF URETER: Chronic | Status: ACTIVE | Noted: 2025-02-03

## 2025-02-07 PROBLEM — E78.5 HYPERLIPIDEMIA, UNSPECIFIED: Chronic | Status: ACTIVE | Noted: 2025-02-03

## 2025-02-07 PROBLEM — R73.03 PREDIABETES: Chronic | Status: ACTIVE | Noted: 2025-02-03

## 2025-02-07 PROBLEM — I10 ESSENTIAL (PRIMARY) HYPERTENSION: Chronic | Status: ACTIVE | Noted: 2025-02-03

## 2025-02-07 LAB
ANION GAP SERPL CALC-SCNC: 12 MMOL/L — SIGNIFICANT CHANGE UP (ref 5–17)
BUN SERPL-MCNC: 19.7 MG/DL — SIGNIFICANT CHANGE UP (ref 8–20)
CALCIUM SERPL-MCNC: 9.1 MG/DL — SIGNIFICANT CHANGE UP (ref 8.4–10.5)
CHLORIDE SERPL-SCNC: 103 MMOL/L — SIGNIFICANT CHANGE UP (ref 96–108)
CO2 SERPL-SCNC: 27 MMOL/L — SIGNIFICANT CHANGE UP (ref 22–29)
CREAT SERPL-MCNC: 2.01 MG/DL — HIGH (ref 0.5–1.3)
EGFR: 38 ML/MIN/1.73M2 — LOW
GLUCOSE SERPL-MCNC: 119 MG/DL — HIGH (ref 70–99)
POTASSIUM SERPL-MCNC: 4.5 MMOL/L — SIGNIFICANT CHANGE UP (ref 3.5–5.3)
POTASSIUM SERPL-SCNC: 4.5 MMOL/L — SIGNIFICANT CHANGE UP (ref 3.5–5.3)
SODIUM SERPL-SCNC: 142 MMOL/L — SIGNIFICANT CHANGE UP (ref 135–145)

## 2025-02-07 PROCEDURE — 80048 BASIC METABOLIC PNL TOTAL CA: CPT

## 2025-02-07 PROCEDURE — 36415 COLL VENOUS BLD VENIPUNCTURE: CPT

## 2025-02-11 ENCOUNTER — OUTPATIENT (OUTPATIENT)
Dept: INPATIENT UNIT | Facility: HOSPITAL | Age: 60
LOS: 1 days | End: 2025-02-11
Payer: MEDICAID

## 2025-02-11 ENCOUNTER — APPOINTMENT (OUTPATIENT)
Dept: UROLOGY | Facility: HOSPITAL | Age: 60
End: 2025-02-11

## 2025-02-11 ENCOUNTER — RESULT REVIEW (OUTPATIENT)
Age: 60
End: 2025-02-11

## 2025-02-11 ENCOUNTER — TRANSCRIPTION ENCOUNTER (OUTPATIENT)
Age: 60
End: 2025-02-11

## 2025-02-11 VITALS
WEIGHT: 251.33 LBS | SYSTOLIC BLOOD PRESSURE: 188 MMHG | RESPIRATION RATE: 16 BRPM | OXYGEN SATURATION: 100 % | HEART RATE: 73 BPM | HEIGHT: 76 IN | DIASTOLIC BLOOD PRESSURE: 95 MMHG | TEMPERATURE: 98 F

## 2025-02-11 VITALS
OXYGEN SATURATION: 100 % | DIASTOLIC BLOOD PRESSURE: 91 MMHG | SYSTOLIC BLOOD PRESSURE: 163 MMHG | HEART RATE: 100 BPM | RESPIRATION RATE: 16 BRPM

## 2025-02-11 DIAGNOSIS — Z87.442 PERSONAL HISTORY OF URINARY CALCULI: Chronic | ICD-10-CM

## 2025-02-11 DIAGNOSIS — R10.9 UNSPECIFIED ABDOMINAL PAIN: ICD-10-CM

## 2025-02-11 LAB
GLUCOSE BLDC GLUCOMTR-MCNC: 104 MG/DL — HIGH (ref 70–99)
GLUCOSE BLDC GLUCOMTR-MCNC: 112 MG/DL — HIGH (ref 70–99)

## 2025-02-11 PROCEDURE — 82962 GLUCOSE BLOOD TEST: CPT

## 2025-02-11 PROCEDURE — 88300 SURGICAL PATH GROSS: CPT

## 2025-02-11 PROCEDURE — C1766: CPT

## 2025-02-11 PROCEDURE — C1889: CPT

## 2025-02-11 PROCEDURE — 82365 CALCULUS SPECTROSCOPY: CPT

## 2025-02-11 PROCEDURE — C1769: CPT

## 2025-02-11 PROCEDURE — 74420 UROGRAPHY RTRGR +-KUB: CPT | Mod: 26

## 2025-02-11 PROCEDURE — 52356 CYSTO/URETERO W/LITHOTRIPSY: CPT | Mod: LT

## 2025-02-11 PROCEDURE — C2617: CPT

## 2025-02-11 PROCEDURE — 88300 SURGICAL PATH GROSS: CPT | Mod: 26

## 2025-02-11 PROCEDURE — C1758: CPT

## 2025-02-11 DEVICE — URETERAL STENT CONTOUR 7FR 26CM: Type: IMPLANTABLE DEVICE | Status: FUNCTIONAL

## 2025-02-11 DEVICE — URETERAL SHEATH NAVIGATOR HD 11/13FR X 46CM: Type: IMPLANTABLE DEVICE | Status: FUNCTIONAL

## 2025-02-11 DEVICE — STONE BASKET ZEROTIP NITINOL 4-WIRE 1.9FR 120CM X 12MM: Type: IMPLANTABLE DEVICE | Status: FUNCTIONAL

## 2025-02-11 DEVICE — LASER FIBER SOLTIVE 200: Type: IMPLANTABLE DEVICE | Status: FUNCTIONAL

## 2025-02-11 DEVICE — GUIDEWIRE SENSOR DUAL-FLEX NITINOL STRAIGHT .035" X 150CM: Type: IMPLANTABLE DEVICE | Status: FUNCTIONAL

## 2025-02-11 DEVICE — URETERAL CATH AXXCESS OPEN END 6FR 70CM: Type: IMPLANTABLE DEVICE | Status: FUNCTIONAL

## 2025-02-11 RX ORDER — ONDANSETRON 4 MG/1
4 TABLET, ORALLY DISINTEGRATING ORAL ONCE
Refills: 0 | Status: DISCONTINUED | OUTPATIENT
Start: 2025-02-11 | End: 2025-02-11

## 2025-02-11 RX ORDER — ACETAMINOPHEN 160 MG/5ML
975 SUSPENSION ORAL ONCE
Refills: 0 | Status: COMPLETED | OUTPATIENT
Start: 2025-02-11 | End: 2025-02-11

## 2025-02-11 RX ORDER — SOLIFENACIN SUCCINATE 10 MG/1
1 TABLET, FILM COATED ORAL
Qty: 31 | Refills: 0
Start: 2025-02-11 | End: 2025-03-13

## 2025-02-11 RX ORDER — OXYCODONE HYDROCHLORIDE 30 MG/1
5 TABLET ORAL ONCE
Refills: 0 | Status: DISCONTINUED | OUTPATIENT
Start: 2025-02-11 | End: 2025-02-11

## 2025-02-11 RX ORDER — HYDRALAZINE HCL 100 MG
10 TABLET ORAL ONCE
Refills: 0 | Status: COMPLETED | OUTPATIENT
Start: 2025-02-11 | End: 2025-02-11

## 2025-02-11 RX ORDER — TAMSULOSIN HYDROCHLORIDE 0.4 MG/1
1 CAPSULE ORAL
Qty: 31 | Refills: 0
Start: 2025-02-11 | End: 2025-03-13

## 2025-02-11 RX ORDER — DIPHENHYDRAMINE HCL 25 MG
25 CAPSULE ORAL ONCE
Refills: 0 | Status: COMPLETED | OUTPATIENT
Start: 2025-02-11 | End: 2025-02-11

## 2025-02-11 RX ORDER — FENTANYL CITRATE 50 UG/ML
25 INJECTION INTRAMUSCULAR; INTRAVENOUS
Refills: 0 | Status: DISCONTINUED | OUTPATIENT
Start: 2025-02-11 | End: 2025-02-11

## 2025-02-11 RX ORDER — SODIUM CHLORIDE 9 G/ML
1000 INJECTION, SOLUTION INTRAVENOUS
Refills: 0 | Status: DISCONTINUED | OUTPATIENT
Start: 2025-02-11 | End: 2025-02-11

## 2025-02-11 RX ORDER — ROSUVASTATIN CALCIUM 10 MG/1
10 TABLET, FILM COATED ORAL
Refills: 0 | DISCHARGE

## 2025-02-11 RX ORDER — KETOROLAC TROMETHAMINE 10 MG
15 TABLET ORAL ONCE
Refills: 0 | Status: DISCONTINUED | OUTPATIENT
Start: 2025-02-11 | End: 2025-02-11

## 2025-02-11 RX ORDER — CEFAZOLIN SODIUM IN 0.9 % NACL 2 G/10 ML
2000 SYRINGE (ML) INTRAVENOUS ONCE
Refills: 0 | Status: COMPLETED | OUTPATIENT
Start: 2025-02-11 | End: 2025-02-11

## 2025-02-11 RX ORDER — AMLODIPINE BESYLATE 5 MG
0.5 TABLET ORAL
Refills: 0 | DISCHARGE

## 2025-02-11 RX ADMIN — Medication 15 MILLIGRAM(S): at 17:03

## 2025-02-11 RX ADMIN — Medication 15 MILLIGRAM(S): at 17:12

## 2025-02-11 RX ADMIN — FENTANYL CITRATE 25 MICROGRAM(S): 50 INJECTION INTRAMUSCULAR; INTRAVENOUS at 17:12

## 2025-02-11 RX ADMIN — FENTANYL CITRATE 25 MICROGRAM(S): 50 INJECTION INTRAMUSCULAR; INTRAVENOUS at 17:30

## 2025-02-11 RX ADMIN — Medication 2000 MILLIGRAM(S): at 15:45

## 2025-02-11 RX ADMIN — Medication 10 MILLIGRAM(S): at 17:40

## 2025-02-11 RX ADMIN — Medication 25 MILLIGRAM(S): at 15:04

## 2025-02-11 RX ADMIN — ACETAMINOPHEN 975 MILLIGRAM(S): 160 SUSPENSION ORAL at 14:52

## 2025-02-11 RX ADMIN — FENTANYL CITRATE 25 MICROGRAM(S): 50 INJECTION INTRAMUSCULAR; INTRAVENOUS at 17:04

## 2025-02-11 RX ADMIN — Medication 10 MILLIGRAM(S): at 17:04

## 2025-02-11 NOTE — ASU DISCHARGE PLAN (ADULT/PEDIATRIC) - CARE PROVIDER_API CALL
Araceli Connell  Urology  [address], [address]  [place] [address]  Phone: [phone]  Fax: [phone]  Follow Up Time:

## 2025-02-11 NOTE — BRIEF OPERATIVE NOTE - OPERATION/FINDINGS
ureteral stone impacted in the distal ureter with concern for developing stricture  laser lithotripsy to renal stones  stent with no string

## 2025-02-11 NOTE — ASU PREOP CHECKLIST - BMI (KG/M2)
Quality 110: Preventive Care And Screening: Influenza Immunization: Influenza Immunization previously received during influenza season Detail Level: Detailed Quality 226: Preventive Care And Screening: Tobacco Use: Screening And Cessation Intervention: Patient screened for tobacco use and is an ex/non-smoker 30.6

## 2025-02-11 NOTE — ASU DISCHARGE PLAN (ADULT/PEDIATRIC) - ASU DC SPECIAL INSTRUCTIONSFT
Dr. Bob' office will contact you in the next few days to schedule follow u[p appointment.  For any pain or discomfort can use over the counter medication.  If pain is not under control contact the office .  Continue to take the antibiotic as ordered for 4 more days.

## 2025-02-11 NOTE — ASU DISCHARGE PLAN (ADULT/PEDIATRIC) - FINANCIAL ASSISTANCE
[facility] provides services at a reduced cost to those who are determined to be eligible through [facility]’s financial assistance program. Information regarding [facility]’s financial assistance program can be found by going to [email or web address] or by calling [phone].

## 2025-02-12 LAB — SURGICAL PATHOLOGY STUDY: SIGNIFICANT CHANGE UP

## 2025-02-19 LAB
CELL MATERIAL STONE EST-MCNT: SIGNIFICANT CHANGE UP
LABORATORY COMMENT REPORT: SIGNIFICANT CHANGE UP
NIDUS STONE QN: SIGNIFICANT CHANGE UP

## 2025-02-21 ENCOUNTER — APPOINTMENT (OUTPATIENT)
Dept: DERMATOLOGY | Facility: CLINIC | Age: 60
End: 2025-02-21
Payer: MEDICAID

## 2025-02-21 PROCEDURE — 99204 OFFICE O/P NEW MOD 45 MIN: CPT

## 2025-03-03 ENCOUNTER — APPOINTMENT (OUTPATIENT)
Dept: UROLOGY | Facility: CLINIC | Age: 60
End: 2025-03-03
Payer: MEDICAID

## 2025-03-03 DIAGNOSIS — N20.0 CALCULUS OF KIDNEY: ICD-10-CM

## 2025-03-03 PROCEDURE — 52310 CYSTOSCOPY AND TREATMENT: CPT

## 2025-03-17 ENCOUNTER — APPOINTMENT (OUTPATIENT)
Dept: ULTRASOUND IMAGING | Facility: CLINIC | Age: 60
End: 2025-03-17

## 2025-03-24 ENCOUNTER — APPOINTMENT (OUTPATIENT)
Dept: UROLOGY | Facility: CLINIC | Age: 60
End: 2025-03-24

## 2025-04-29 ENCOUNTER — APPOINTMENT (OUTPATIENT)
Dept: DERMATOLOGY | Facility: CLINIC | Age: 60
End: 2025-04-29
Payer: MEDICAID

## 2025-04-29 PROCEDURE — 99214 OFFICE O/P EST MOD 30 MIN: CPT

## 2025-09-11 ENCOUNTER — APPOINTMENT (OUTPATIENT)
Dept: DERMATOLOGY | Facility: CLINIC | Age: 60
End: 2025-09-11
Payer: MEDICAID

## 2025-09-11 PROCEDURE — 11104 PUNCH BX SKIN SINGLE LESION: CPT

## 2025-09-19 ENCOUNTER — APPOINTMENT (OUTPATIENT)
Dept: DERMATOLOGY | Facility: CLINIC | Age: 60
End: 2025-09-19
Payer: MEDICAID

## 2025-09-19 PROCEDURE — ZZZZZ: CPT

## (undated) DEVICE — WARMING BLANKET UPPER ADULT

## (undated) DEVICE — GLV 7.5 PROTEXIS (WHITE)

## (undated) DEVICE — Device

## (undated) DEVICE — IRR BULB PATHFINDER + 10"

## (undated) DEVICE — TUBING IRR SET FOR CYSTOSCOPY 77"

## (undated) DEVICE — SOL IRR BAG H2O 3000ML

## (undated) DEVICE — VENODYNE/SCD SLEEVE CALF MEDIUM

## (undated) DEVICE — GOWN TRIMAX LG

## (undated) DEVICE — PORT BIOPSY

## (undated) DEVICE — PACK CYSTOSCOPY TIBURON

## (undated) DEVICE — TUBING SUCTION 20FT

## (undated) DEVICE — TUBING TUR 2 PRONG

## (undated) DEVICE — PRESSURE INFUSOR BAG 3000ML

## (undated) DEVICE — VISITEC 4X4

## (undated) DEVICE — SOL IRR BAG NS 0.9% 3000ML

## (undated) DEVICE — VALVE ENDO SURESEAL II 0-5FR

## (undated) DEVICE — DRAPE C ARM UNIVERSAL